# Patient Record
Sex: FEMALE | Race: WHITE | NOT HISPANIC OR LATINO | URBAN - METROPOLITAN AREA
[De-identification: names, ages, dates, MRNs, and addresses within clinical notes are randomized per-mention and may not be internally consistent; named-entity substitution may affect disease eponyms.]

---

## 2019-04-18 ENCOUNTER — EMERGENCY (EMERGENCY)
Facility: HOSPITAL | Age: 59
LOS: 1 days | Discharge: HOME | End: 2019-04-18
Attending: EMERGENCY MEDICINE | Admitting: EMERGENCY MEDICINE
Payer: COMMERCIAL

## 2019-04-18 VITALS
HEART RATE: 76 BPM | RESPIRATION RATE: 16 BRPM | OXYGEN SATURATION: 100 % | TEMPERATURE: 98 F | DIASTOLIC BLOOD PRESSURE: 88 MMHG | SYSTOLIC BLOOD PRESSURE: 154 MMHG

## 2019-04-18 PROCEDURE — 99282 EMERGENCY DEPT VISIT SF MDM: CPT | Mod: 25

## 2019-04-18 NOTE — ED ADULT TRIAGE NOTE - CHIEF COMPLAINT QUOTE
Patient c/o floaters in the right eye, headache and fatigue. 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 Patient states she has not been as compliant with her medications. Hx. Depression, HTN, DM, vitreal detachment.

## 2019-04-19 VITALS
OXYGEN SATURATION: 100 % | RESPIRATION RATE: 18 BRPM | DIASTOLIC BLOOD PRESSURE: 88 MMHG | HEART RATE: 78 BPM | SYSTOLIC BLOOD PRESSURE: 170 MMHG

## 2019-04-19 RX ORDER — ONDANSETRON 8 MG/1
4 TABLET, FILM COATED ORAL ONCE
Qty: 0 | Refills: 0 | Status: DISCONTINUED | OUTPATIENT
Start: 2019-04-19 | End: 2019-04-19

## 2019-04-19 RX ORDER — SUCRALFATE 1 G
1 TABLET ORAL ONCE
Qty: 0 | Refills: 0 | Status: DISCONTINUED | OUTPATIENT
Start: 2019-04-19 | End: 2019-04-19

## 2019-04-19 NOTE — ED PROVIDER NOTE - EYES, MLM
Clear bilaterally, pupils equal, round and reactive to light. Orbits non-tender. Visual acuity 20/20 bilaterally. EOMI.

## 2019-04-19 NOTE — ED PROVIDER NOTE - NSFOLLOWUPCLINICS_GEN_ALL_ED_FT
[facility] - Ophthalmology  Ophthalmology  [address]  [place] [address]  Phone: [phone]  Fax:   Follow Up Time:

## 2019-04-19 NOTE — ED PROVIDER NOTE - ATTENDING CONTRIBUTION TO CARE
I performed a face to face bedside interview with patient regarding history of present illness, review of symptoms and past medical history. I completed an independent physical exam.  I have discussed patient's plan of care.   I agree with note as stated above, having amended the EMR as needed to reflect my findings. I have discussed the assessment and plan of care.  This includes during the time I functioned as the attending physician for this patient.  Attending Contribution to Care: agree with plan of resident. pt p/w floaters and acute vision changes. bedside us neg for retinal detachment, IOP wnl, b/l vision intact. no pain in eye. pt stable for f/u with optho outpt.

## 2019-04-19 NOTE — ED PROVIDER NOTE - CLINICAL SUMMARY MEDICAL DECISION MAKING FREE TEXT BOX
Jonathan Weil, PGY2 - Visual acuity normal, no signs of abnormalities on anterior chamber exam, POCUS shows vitreous floaters w/ detachment or hemorrhage, dc w/ 24-48hr optho f/u Jonathan Weil, PGY2 - Visual acuity normal, no abnormalities on anterior chamber exam, POCUS shows vitreous floaters w/o detachment or hemorrhage, dc w/ 24-48hr ophtho f/u

## 2019-04-19 NOTE — ED ADULT NURSE NOTE - OBJECTIVE STATEMENT
Pt received in rm 18, 58Y female, Aox4, ambulatory, with multiple complaints. Pt reports headache 4/10 x1day and floaters in right eye. Pt reports increase in BP and stress at home. Pt reports has not been taking care of self at home due to stress associated with caring for mother, Pt reports recent decreased eating/drinking. Pmhx HTN on metoprolol and DM on metformin. Pt denies any sob, cp, dizziness, lightheadedness, N/V/D. Pt breathing even and unlabored, appears in NAD, vitals a charted, waiting on MD vick will continue to monitor. Pt received in rm 18, 58Y female, Aox4, ambulatory, with multiple complaints. Pt reports headache 4/10 severity x1day and floaters in right eye starting last night. Pt reports increase in BP and stress at home. Pt reports has not been taking care of herself at home due to stress associated with caring for sick mother, Pt reports recent decrease in eating/drinking. Pmhx HTN on metoprolol and DM on metformin, compliant with medications. Pt denies any sob, cp, dizziness, lightheadedness, N/V/D. Pt breathing even and unlabored, appears in NAD, vitals a charted, waiting on MD vick will continue to monitor.

## 2019-04-19 NOTE — ED PROVIDER NOTE - OBJECTIVE STATEMENT
58F p/w a vision change for 1 day. She describes seeing floaters in her R eye since yesterday afternoon. No known inciting factor. She sees them intermittently. Notes a frontal headache intermittently for the past week, but this is typical in location, quality, and severity for her hx migraines.

## 2019-04-19 NOTE — ED PROVIDER NOTE - CHIEF COMPLAINT
The patient is a 58y Female complaining of multiple medical complaints. The patient is a 58y Female complaining of vision change

## 2019-04-19 NOTE — ED ADULT NURSE NOTE - NSIMPLEMENTINTERV_GEN_ALL_ED
Implemented All Universal Safety Interventions:  [place] to call system. Call bell, personal items and telephone within reach. Instruct patient to call for assistance. Room bathroom lighting operational. Non-slip footwear when patient is off stretcher. Physically safe environment: no spills, clutter or unnecessary equipment. Stretcher in lowest position, wheels locked, appropriate side rails in place.

## 2019-04-19 NOTE — ED ADULT NURSE REASSESSMENT NOTE - NS ED NURSE REASSESS COMMENT FT1
Report received from [facility]. Pt resting and appears in NAD. pt reports photophobia and needing to sleep; lights dimmed, pt given blankets and pillow. call bell within reach.

## 2019-04-19 NOTE — ED PROVIDER NOTE - NSFOLLOWUPINSTRUCTIONS_ED_ALL_ED_FT
Follow up with ophthalmology in 24-48 hours.    Return to the ER for further vision changes or decreased vision, blurry vision, severe headache, or any other new concerning symptoms.

## 2025-02-06 NOTE — ED PROVIDER NOTE - CROS ED NEURO POS
HEADACHE
-endorsing that has been having chest pain across the chest with similar presentation as a couple of weeks before when was admitted   -echo on 1/2025 with G1DD   - trops 1x negative  -EKG with no arrythmia, ischemic changes   -likely pain is oncologic   -will give dilaudid PRN   -bowel regimen